# Patient Record
Sex: FEMALE | Race: BLACK OR AFRICAN AMERICAN | Employment: FULL TIME | URBAN - METROPOLITAN AREA
[De-identification: names, ages, dates, MRNs, and addresses within clinical notes are randomized per-mention and may not be internally consistent; named-entity substitution may affect disease eponyms.]

---

## 2017-02-08 ENCOUNTER — OFFICE VISIT (OUTPATIENT)
Dept: INTERNAL MEDICINE CLINIC | Age: 42
End: 2017-02-08

## 2017-02-08 VITALS
BODY MASS INDEX: 25.17 KG/M2 | TEMPERATURE: 98.8 F | WEIGHT: 156.6 LBS | DIASTOLIC BLOOD PRESSURE: 89 MMHG | SYSTOLIC BLOOD PRESSURE: 130 MMHG | OXYGEN SATURATION: 100 % | HEIGHT: 66 IN | HEART RATE: 96 BPM

## 2017-02-08 DIAGNOSIS — J06.9 ACUTE URI: Primary | ICD-10-CM

## 2017-02-08 LAB
QUICKVUE INFLUENZA TEST: NEGATIVE
VALID INTERNAL CONTROL?: YES

## 2017-02-08 RX ORDER — METHYLPREDNISOLONE 4 MG/1
TABLET ORAL
Qty: 1 DOSE PACK | Refills: 0 | Status: SHIPPED | OUTPATIENT
Start: 2017-02-08

## 2017-02-08 RX ORDER — AMOXICILLIN AND CLAVULANATE POTASSIUM 875; 125 MG/1; MG/1
1 TABLET, FILM COATED ORAL 2 TIMES DAILY
Qty: 14 TAB | Refills: 0 | Status: SHIPPED | OUTPATIENT
Start: 2017-02-08 | End: 2017-02-15

## 2017-02-08 NOTE — PROGRESS NOTES
HISTORY OF PRESENT ILLNESS  Mike Marshall is a 42 y.o. female. Sore Throat    The history is provided by the patient. This is a new problem. The current episode started more than 2 days ago. The problem has been rapidly improving. There has been no fever. Associated symptoms include congestion, ear pain (BL ears) and cough (non productive). Pertinent negatives include no shortness of breath. The treatment provided no relief. pt reports of recent URI and her throat has not been improving with this   Pt states she has post nasal drip primarily that will not resolve   Her cough is not productive and does not clear mucous in her throat   Pt has been using nyquil, hot tea, increased fluids, and broths for sx  Discussed only treatment for lost voice/pharyngitis is voice rest   Discussed rapid flu test- if negative, will give augmentin and steroid burst   Advised zyrtec and flonase OTC     Of note, has not had her flu shot this year     Patient Active Problem List    Diagnosis Date Noted    H/O myomectomy 06/09/2015    Amenorrhea 11/05/2014    Adenomyosis 10/08/2014    Dysmenorrhea 08/20/2014    Endometriosis 08/20/2014    Dyspareunia 08/20/2014    Menorrhagia 08/20/2014     Current Outpatient Prescriptions   Medication Sig Dispense Refill    methylPREDNISolone (MEDROL DOSEPACK) 4 mg tablet Take as directed 1 Dose Pack 0    cyclobenzaprine (FLEXERIL) 5 mg tablet Take 1 Tab by mouth two (2) times a day. Indications: MUSCLE SPASM 10 Tab 1    FOLIC ACID PO Take  by mouth.  VITAMIN A PO Take  by mouth.  ASCORBATE CALCIUM (VITAMIN C PO) Take  by mouth.  IBUPROFEN PO Take  by mouth.  fluticasone (FLONASE) 50 mcg/actuation nasal spray nightly.  CETIRIZINE HCL (ZYRTEC PO) Take  by mouth.  omega-3 fatty acids-vitamin e (FISH OIL) 1,000 mg cap Take 3 capsules by mouth daily.  CALCIUM CARBONATE (CALCIUM 500 PO) Take 1 tablet by mouth daily.       Cholecalciferol, Vitamin D3, (VITAMIN D3) 1,000 unit cap Take 1 tablet by mouth daily.  prenatal multivit-ca-min-fe-fa tab Take 1 tablet by mouth daily.  vitamin E (AQUA GEMS) 400 unit capsule Take 800 Units by mouth daily.  zinc 50 mg tab tablet Take 50 mg by mouth daily.  STROUD SEAL EXTRACT/ECH PUR XT (ECHINACEA & GOLDENSEAL PO) Take 1 tablet by mouth daily.  ferrous sulfate 325 mg (65 mg iron) tablet Take 325 mg by mouth Daily (before breakfast). Past Surgical History   Procedure Laterality Date    Hx dilation and curettage  04/17/2014     hysteroscopy D&C     Hx pelvic laparoscopy  04/17/2014     focal spot of endometriosis and multiple small fibroids and on 4 cm intramural posterior fundal fibrioid, posterior and inferior to right fallopian tube cornual region. Lab Results  Component Value Date/Time   WBC 3.9 08/25/2016 08:17 AM   HGB 12.7 08/25/2016 08:17 AM   HCT 39.8 08/25/2016 08:17 AM   PLATELET 147 42/02/1814 08:17 AM   MCV 88 08/25/2016 08:17 AM       Lab Results  Component Value Date/Time   Cholesterol, total 170 08/25/2016 08:17 AM   HDL Cholesterol 71 08/25/2016 08:17 AM   LDL, calculated 92 08/25/2016 08:17 AM   Triglyceride 34 08/25/2016 08:17 AM       Lab Results  Component Value Date/Time   GFR est  08/25/2016 08:17 AM   GFR est non-AA 91 08/25/2016 08:17 AM   Creatinine 0.80 08/25/2016 08:17 AM   BUN 11 08/25/2016 08:17 AM   Sodium 140 08/25/2016 08:17 AM   Potassium 4.7 08/25/2016 08:17 AM   Chloride 103 08/25/2016 08:17 AM   CO2 23 08/25/2016 08:17 AM         Review of Systems   Constitutional: Positive for chills. Negative for fever. HENT: Positive for congestion, ear pain (BL ears) and sore throat. Respiratory: Positive for cough (non productive). Negative for shortness of breath. Cardiovascular: Negative for chest pain. Physical Exam   Constitutional: She is oriented to person, place, and time. She appears well-developed and well-nourished. No distress.    HENT:   Head: Normocephalic and atraumatic. Left Ear: External ear normal.   Mouth/Throat: Oropharynx is clear and moist. No oropharyngeal exudate. Erythema in throat-mild  Mild cerumen R ear   Fluid behind R tympanic membrane  Purulent fluid R ear    Eyes: Conjunctivae and EOM are normal. Right eye exhibits no discharge. Left eye exhibits no discharge. Neck: Normal range of motion. Neck supple. Cardiovascular: Normal rate, regular rhythm, normal heart sounds and intact distal pulses. Exam reveals no gallop and no friction rub. No murmur heard. Pulmonary/Chest: Effort normal and breath sounds normal. No respiratory distress. She has no wheezes. She has no rales. She exhibits no tenderness. Musculoskeletal: Normal range of motion. She exhibits no edema, tenderness or deformity. Lymphadenopathy:     She has no cervical adenopathy. Neurological: She is alert and oriented to person, place, and time. Coordination normal.   Skin: Skin is warm and dry. No rash noted. She is not diaphoretic. No erythema. No pallor. Psychiatric: She has a normal mood and affect. Her behavior is normal.       ASSESSMENT and PLAN    ICD-10-CM ICD-9-CM    1. Acute URI    Rapid flu test negative, will treat with augmentin BID for 7 days and medrol dose pack for persistent cough and congestion, zyrtec and flonase OTC, contact clinic if not improving J06.9 465.9       Depression screen reviewed and negative    Written by Layo Conte, as dictated by Chicho Paul MD.    Current diagnosis and concerns discussed with pt at length. Understands risks and benefits or current treatment plan and medications and accepts the treatment and medication with any possible risks.   Pt asks appropriate questions which were answered.   Pt instructed to call with any concerns or problems.

## 2017-02-08 NOTE — MR AVS SNAPSHOT
Visit Information Date & Time Provider Department Dept. Phone Encounter #  
 2/8/2017 12:15 PM Jodi Michel, [address] [place] [address],[address] [phone] [id number] Follow-up Instructions Return if symptoms worsen or fail to improve. Upcoming Health Maintenance Date Due DTaP/Tdap/Td series (1 - Tdap) 5/19/1995 PAP AKA CERVICAL CYTOLOGY 5/19/1995 INFLUENZA AGE 9 TO ADULT 8/1/2016 Allergies as of 2/8/2017  Review Complete On: 2/8/2017 By: Jodi Michel MD  
  
 Severity Noted Reaction Type Reactions Eucalyptus  05/23/2016    Rash Current Immunizations  Never Reviewed No immunizations on file. Not reviewed this visit You Were Diagnosed With   
  
 Codes Comments Acute URI    -  Primary ICD-10-CM: J06.9 ICD-9-CM: 465.9 Vitals BP Pulse Temp Height(growth percentile) Weight(growth percentile) SpO2  
 130/89 (BP 1 Location: Right arm, BP Patient Position: Sitting) 96 98.8 °F (37.1 °C) (Oral) 5' 6\" (1.676 m) 156 lb 9.6 oz (71 kg) 100% BMI OB Status Smoking Status 25.28 kg/m2 Unknown Never Smoker BMI and BSA Data Body Mass Index Body Surface Area  
 25.28 kg/m 2 1.82 m 2 Preferred Pharmacy Pharmacy Name Phone [facility] [address] [phone] Your Updated Medication List  
  
   
This list is accurate as of: 2/8/17  1:17 PM.  Always use your most recent med list.  
  
  
  
  
 amoxicillin-clavulanate 875-125 mg per tablet Commonly known as:  AUGMENTIN Take 1 Tab by mouth two (2) times a day for 7 days. CALCIUM 500 PO Take 1 tablet by mouth daily. cyclobenzaprine 5 mg tablet Commonly known as:  FLEXERIL Take 1 Tab by mouth two (2) times a day. Indications: MUSCLE SPASM  
  
 ECHINACEA & GOLDENSEAL PO Take 1 tablet by mouth daily. ferrous sulfate 325 mg (65 mg iron) tablet Take 325 mg by mouth Daily (before breakfast). FISH OIL 1,000 mg Cap Generic drug:  omega-3 fatty acids-vitamin e Take 3 capsules by mouth daily. fluticasone 50 mcg/actuation nasal spray Commonly known as:  FLONASE  
nightly. FOLIC ACID PO Take  by mouth. IBUPROFEN PO Take  by mouth. * methylPREDNISolone 4 mg tablet Commonly known as:  Belén Mayberg Take as directed * methylPREDNISolone 4 mg tablet Commonly known as:  Belén Mayberg Per pack instruction  
  
 prenatal multivit-ca-min-fe-fa Tab Take 1 tablet by mouth daily. VITAMIN A PO Take  by mouth. VITAMIN C PO Take  by mouth. VITAMIN D3 1,000 unit Cap Generic drug:  cholecalciferol Take 1 tablet by mouth daily. vitamin E 400 unit capsule Commonly known as:  [address] Take 800 Units by mouth daily. zinc 50 mg Tab tablet Take 50 mg by mouth daily. ZYRTEC PO Take  by mouth. * Notice: This list has 2 medication(s) that are the same as other medications prescribed for you. Read the directions carefully, and ask your doctor or other care provider to review them with you. Prescriptions Sent to Pharmacy Refills  
 amoxicillin-clavulanate (AUGMENTIN) 875-125 mg per tablet 0 Sig: Take 1 Tab by mouth two (2) times a day for 7 days. Class: Normal  
 Pharmacy: [facility] [place] [address] Ph #: [phone] Route: Oral  
 methylPREDNISolone (MEDROL DOSEPACK) 4 mg tablet 0 Sig: Per pack instruction Class: Normal  
 Pharmacy: [facility] [place] [address] Ph #: [phone] Follow-up Instructions Return if symptoms worsen or fail to improve. Introducing [facility]! Dear Holly Anand: Thank you for requesting a [employer] account. Our records indicate that you already have an active [employer] account. You can access your account anytime at [email or web address] Did you know that you can access your hospital and ER discharge instructions at any time in [employer]? You can also review all of your test results from your hospital stay or ER visit. Additional Information If you have questions, please visit the Frequently Asked Questions section of the [employer] website at [email or web address] Remember, [employer] is NOT to be used for urgent needs. For medical emergencies, dial 911. Now available from your iPhone and Android! Please provide this summary of care documentation to your next provider. Your primary care clinician is listed as Korey Silva. If you have any questions after today's visit, please call [phone].

## 2017-02-09 ENCOUNTER — TELEPHONE (OUTPATIENT)
Dept: INTERNAL MEDICINE CLINIC | Age: 42
End: 2017-02-09

## 2017-02-09 NOTE — TELEPHONE ENCOUNTER
Pt wants to ask why was she refused services today at the practice with Dr. Alok Garcia. The best contact number is [phone].        Message received & copied from [facility] after closing on 2/8/17

## 2017-02-10 NOTE — TELEPHONE ENCOUNTER
I did not see the patient because she was late for her appointment. Her appointment time slot was over when she checked in yesterday. No one reported to me that day that she was anything other than late for this appointment.

## 2020-05-20 ENCOUNTER — HOSPITAL ENCOUNTER (OUTPATIENT)
Dept: MRI IMAGING | Age: 45
Discharge: HOME | End: 2020-05-20
Attending: OBSTETRICS & GYNECOLOGY
Payer: COMMERCIAL

## 2020-05-20 DIAGNOSIS — D25.9 LEIOMYOMA OF UTERUS, UNSPECIFIED: ICD-10-CM

## 2020-05-20 PROCEDURE — 72197 MRI PELVIS W/O & W/DYE: CPT

## 2020-05-20 PROCEDURE — 74011250636 HC RX REV CODE- 250/636: Performed by: OBSTETRICS & GYNECOLOGY

## 2020-05-20 PROCEDURE — A9575 INJ GADOTERATE MEGLUMI 0.1ML: HCPCS | Performed by: OBSTETRICS & GYNECOLOGY

## 2020-05-20 RX ORDER — GADOTERATE MEGLUMINE 376.9 MG/ML
14 INJECTION INTRAVENOUS
Status: COMPLETED | OUTPATIENT
Start: 2020-05-20 | End: 2020-05-20

## 2020-05-20 RX ADMIN — GADOTERATE MEGLUMINE 14 ML: 376.9 INJECTION INTRAVENOUS at 19:19

## 2023-05-22 RX ORDER — FERROUS SULFATE 325(65) MG
325 TABLET ORAL
COMMUNITY

## 2023-05-22 RX ORDER — METHYLPREDNISOLONE 4 MG/1
TABLET ORAL
COMMUNITY
Start: 2016-05-23

## 2023-05-22 RX ORDER — CYCLOBENZAPRINE HCL 5 MG
5 TABLET ORAL 2 TIMES DAILY
COMMUNITY
Start: 2016-05-23

## 2023-05-22 RX ORDER — FLUTICASONE PROPIONATE 50 MCG
SPRAY, SUSPENSION (ML) NASAL
COMMUNITY